# Patient Record
Sex: FEMALE | URBAN - METROPOLITAN AREA
[De-identification: names, ages, dates, MRNs, and addresses within clinical notes are randomized per-mention and may not be internally consistent; named-entity substitution may affect disease eponyms.]

---

## 2017-01-13 ENCOUNTER — APPOINTMENT (RX ONLY)
Dept: URBAN - METROPOLITAN AREA CLINIC 126 | Facility: CLINIC | Age: 42
Setting detail: DERMATOLOGY
End: 2017-01-13

## 2017-01-13 DIAGNOSIS — Z41.9 ENCOUNTER FOR PROCEDURE FOR PURPOSES OTHER THAN REMEDYING HEALTH STATE, UNSPECIFIED: ICD-10-CM

## 2017-01-13 PROCEDURE — ? CHEMICAL PEEL

## 2017-01-13 ASSESSMENT — LOCATION ZONE DERM: LOCATION ZONE: FACE

## 2017-01-13 ASSESSMENT — LOCATION DETAILED DESCRIPTION DERM: LOCATION DETAILED: LEFT INFERIOR CENTRAL MALAR CHEEK

## 2017-01-13 ASSESSMENT — LOCATION SIMPLE DESCRIPTION DERM: LOCATION SIMPLE: LEFT CHEEK

## 2017-01-13 NOTE — PROCEDURE: CHEMICAL PEEL
Treatment Time (Optional): 5 min
Prep: The treated area was degreased with pre-peel cleanser, and vaseline was applied for protection of mucous membranes.
Chemical Peel: Micropeel 20 Solution
Post-Care Instructions: I reviewed with the patient in detail post-care instructions. Patient should avoid sun exposure and wear sun protection.
Consent: Prior to the procedure, written consent was obtained and risks were reviewed, including but not limited to: redness, peeling, blistering, pigmentary change, scarring, infection, and pain.
Detail Level: Zone
Post Peel Care: After the procedure, phyto correct was applied to the treated areas. Sun protection and post-care instructions were reviewed with the patient.
Treatment Number: 0

## 2017-02-14 ENCOUNTER — APPOINTMENT (RX ONLY)
Dept: URBAN - METROPOLITAN AREA CLINIC 126 | Facility: CLINIC | Age: 42
Setting detail: DERMATOLOGY
End: 2017-02-14

## 2017-02-14 DIAGNOSIS — Z41.9 ENCOUNTER FOR PROCEDURE FOR PURPOSES OTHER THAN REMEDYING HEALTH STATE, UNSPECIFIED: ICD-10-CM

## 2017-02-14 PROCEDURE — ? FACIAL

## 2017-02-14 ASSESSMENT — LOCATION SIMPLE DESCRIPTION DERM: LOCATION SIMPLE: LEFT CHEEK

## 2017-02-14 ASSESSMENT — LOCATION ZONE DERM: LOCATION ZONE: FACE

## 2017-02-14 ASSESSMENT — LOCATION DETAILED DESCRIPTION DERM: LOCATION DETAILED: LEFT INFERIOR CENTRAL MALAR CHEEK

## 2017-02-14 NOTE — PROCEDURE: FACIAL
Treatment Type (Optional): Spa Facial
Facial Steaming: steamed
Exfoliation Type: enzyme
Detail Level: Zone
Extraction Method: extractor
Mask Type (Optional): hydrating B5

## 2017-03-15 ENCOUNTER — APPOINTMENT (RX ONLY)
Dept: URBAN - METROPOLITAN AREA CLINIC 126 | Facility: CLINIC | Age: 42
Setting detail: DERMATOLOGY
End: 2017-03-15

## 2017-03-15 DIAGNOSIS — Z41.9 ENCOUNTER FOR PROCEDURE FOR PURPOSES OTHER THAN REMEDYING HEALTH STATE, UNSPECIFIED: ICD-10-CM

## 2017-03-15 PROCEDURE — ? FACIAL

## 2017-03-15 ASSESSMENT — LOCATION SIMPLE DESCRIPTION DERM: LOCATION SIMPLE: LEFT CHEEK

## 2017-03-15 ASSESSMENT — LOCATION ZONE DERM: LOCATION ZONE: FACE

## 2017-03-15 ASSESSMENT — LOCATION DETAILED DESCRIPTION DERM: LOCATION DETAILED: LEFT CENTRAL MALAR CHEEK

## 2017-03-15 NOTE — PROCEDURE: FACIAL
Detail Level: Zone
Treatment Type (Optional): Spa Facial
Exfoliation Type: enzyme
Extraction Method: extractor
Facial Steaming: steamed

## 2017-04-25 ENCOUNTER — APPOINTMENT (RX ONLY)
Dept: URBAN - METROPOLITAN AREA CLINIC 126 | Facility: CLINIC | Age: 42
Setting detail: DERMATOLOGY
End: 2017-04-25

## 2017-04-25 DIAGNOSIS — Z41.9 ENCOUNTER FOR PROCEDURE FOR PURPOSES OTHER THAN REMEDYING HEALTH STATE, UNSPECIFIED: ICD-10-CM

## 2017-04-25 PROCEDURE — ? FACIAL

## 2017-04-25 ASSESSMENT — LOCATION SIMPLE DESCRIPTION DERM: LOCATION SIMPLE: LEFT CHEEK

## 2017-04-25 ASSESSMENT — LOCATION ZONE DERM: LOCATION ZONE: FACE

## 2017-04-25 ASSESSMENT — LOCATION DETAILED DESCRIPTION DERM: LOCATION DETAILED: LEFT INFERIOR MEDIAL MALAR CHEEK

## 2017-04-25 NOTE — PROCEDURE: FACIAL
Extraction Method: extractor
Exfoliation Type: enzyme
Detail Level: Zone
Treatment Type (Optional): Spa Facial
Facial Steaming: steamed

## 2017-05-23 ENCOUNTER — APPOINTMENT (RX ONLY)
Dept: URBAN - METROPOLITAN AREA CLINIC 126 | Facility: CLINIC | Age: 42
Setting detail: DERMATOLOGY
End: 2017-05-23

## 2017-05-23 DIAGNOSIS — Z41.9 ENCOUNTER FOR PROCEDURE FOR PURPOSES OTHER THAN REMEDYING HEALTH STATE, UNSPECIFIED: ICD-10-CM

## 2017-05-23 PROCEDURE — ? FACIAL

## 2017-05-23 ASSESSMENT — LOCATION ZONE DERM: LOCATION ZONE: FACE

## 2017-05-23 ASSESSMENT — LOCATION SIMPLE DESCRIPTION DERM: LOCATION SIMPLE: LEFT CHEEK

## 2017-05-23 ASSESSMENT — LOCATION DETAILED DESCRIPTION DERM: LOCATION DETAILED: LEFT INFERIOR CENTRAL MALAR CHEEK

## 2017-05-23 NOTE — PROCEDURE: FACIAL
Exfoliation Type: enzyme
Detail Level: Zone
Facial Steaming: steamed
Treatment Type (Optional): Spa Facial
Extraction Method: extractor

## 2017-08-17 ENCOUNTER — APPOINTMENT (RX ONLY)
Dept: URBAN - METROPOLITAN AREA CLINIC 126 | Facility: CLINIC | Age: 42
Setting detail: DERMATOLOGY
End: 2017-08-17

## 2017-08-17 DIAGNOSIS — Z41.9 ENCOUNTER FOR PROCEDURE FOR PURPOSES OTHER THAN REMEDYING HEALTH STATE, UNSPECIFIED: ICD-10-CM

## 2017-08-17 PROCEDURE — ? FACIAL

## 2017-08-17 ASSESSMENT — LOCATION SIMPLE DESCRIPTION DERM: LOCATION SIMPLE: LEFT CHEEK

## 2017-08-17 ASSESSMENT — LOCATION ZONE DERM: LOCATION ZONE: FACE

## 2017-08-17 ASSESSMENT — LOCATION DETAILED DESCRIPTION DERM: LOCATION DETAILED: LEFT INFERIOR CENTRAL MALAR CHEEK

## 2017-08-17 NOTE — PROCEDURE: FACIAL
Treatment Type (Optional): Spa Facial
Facial Steaming: steamed
Exfoliation Type: enzyme
Extraction Method: extractor
Detail Level: Zone

## 2017-09-26 ENCOUNTER — APPOINTMENT (RX ONLY)
Dept: URBAN - METROPOLITAN AREA CLINIC 126 | Facility: CLINIC | Age: 42
Setting detail: DERMATOLOGY
End: 2017-09-26

## 2017-09-26 DIAGNOSIS — Z41.9 ENCOUNTER FOR PROCEDURE FOR PURPOSES OTHER THAN REMEDYING HEALTH STATE, UNSPECIFIED: ICD-10-CM

## 2017-09-26 PROCEDURE — ? FACIAL

## 2017-09-26 ASSESSMENT — LOCATION SIMPLE DESCRIPTION DERM: LOCATION SIMPLE: LEFT CHEEK

## 2017-09-26 ASSESSMENT — LOCATION ZONE DERM: LOCATION ZONE: FACE

## 2017-09-26 ASSESSMENT — LOCATION DETAILED DESCRIPTION DERM: LOCATION DETAILED: LEFT INFERIOR CENTRAL MALAR CHEEK

## 2017-09-26 NOTE — PROCEDURE: FACIAL
Detail Level: Zone
Facial Steaming: steamed
Extraction Method: extractor
Treatment Type (Optional): Spa Facial
Exfoliation Type: enzyme

## 2017-10-16 ENCOUNTER — APPOINTMENT (RX ONLY)
Dept: URBAN - METROPOLITAN AREA CLINIC 126 | Facility: CLINIC | Age: 42
Setting detail: DERMATOLOGY
End: 2017-10-16

## 2017-10-16 DIAGNOSIS — Z41.9 ENCOUNTER FOR PROCEDURE FOR PURPOSES OTHER THAN REMEDYING HEALTH STATE, UNSPECIFIED: ICD-10-CM

## 2017-10-16 PROCEDURE — ? FACIAL

## 2017-10-16 ASSESSMENT — LOCATION SIMPLE DESCRIPTION DERM: LOCATION SIMPLE: LEFT CHEEK

## 2017-10-16 ASSESSMENT — LOCATION DETAILED DESCRIPTION DERM: LOCATION DETAILED: LEFT INFERIOR CENTRAL MALAR CHEEK

## 2017-10-16 ASSESSMENT — LOCATION ZONE DERM: LOCATION ZONE: FACE

## 2017-10-16 NOTE — PROCEDURE: FACIAL
Treatment Type (Optional): Spa Facial
Exfoliation Type: enzyme
Detail Level: Zone
Extraction Method: extractor
Facial Steaming: steamed

## 2017-11-09 ENCOUNTER — APPOINTMENT (RX ONLY)
Dept: URBAN - METROPOLITAN AREA CLINIC 126 | Facility: CLINIC | Age: 42
Setting detail: DERMATOLOGY
End: 2017-11-09

## 2017-11-09 DIAGNOSIS — Z41.9 ENCOUNTER FOR PROCEDURE FOR PURPOSES OTHER THAN REMEDYING HEALTH STATE, UNSPECIFIED: ICD-10-CM

## 2017-11-09 PROCEDURE — ? FACIAL

## 2017-11-09 ASSESSMENT — LOCATION ZONE DERM: LOCATION ZONE: FACE

## 2017-11-09 ASSESSMENT — LOCATION SIMPLE DESCRIPTION DERM: LOCATION SIMPLE: LEFT CHEEK

## 2017-11-09 ASSESSMENT — LOCATION DETAILED DESCRIPTION DERM: LOCATION DETAILED: LEFT SUPERIOR CENTRAL BUCCAL CHEEK

## 2017-11-09 NOTE — PROCEDURE: FACIAL
Facial Steaming: steamed
Exfoliation Type: enzyme
Extraction Method: extractor
Detail Level: Zone
Treatment Type (Optional): Spa Facial

## 2017-12-07 ENCOUNTER — APPOINTMENT (RX ONLY)
Dept: URBAN - METROPOLITAN AREA CLINIC 126 | Facility: CLINIC | Age: 42
Setting detail: DERMATOLOGY
End: 2017-12-07

## 2017-12-07 DIAGNOSIS — Z41.9 ENCOUNTER FOR PROCEDURE FOR PURPOSES OTHER THAN REMEDYING HEALTH STATE, UNSPECIFIED: ICD-10-CM

## 2017-12-07 PROCEDURE — ? FACIAL

## 2017-12-07 ASSESSMENT — LOCATION SIMPLE DESCRIPTION DERM: LOCATION SIMPLE: LEFT CHEEK

## 2017-12-07 ASSESSMENT — LOCATION ZONE DERM: LOCATION ZONE: FACE

## 2017-12-07 ASSESSMENT — LOCATION DETAILED DESCRIPTION DERM: LOCATION DETAILED: LEFT INFERIOR CENTRAL MALAR CHEEK

## 2017-12-20 ENCOUNTER — APPOINTMENT (RX ONLY)
Dept: URBAN - METROPOLITAN AREA CLINIC 126 | Facility: CLINIC | Age: 42
Setting detail: DERMATOLOGY
End: 2017-12-20

## 2017-12-20 DIAGNOSIS — L82.0 INFLAMED SEBORRHEIC KERATOSIS: ICD-10-CM

## 2017-12-20 PROCEDURE — ? LIQUID NITROGEN

## 2017-12-20 PROCEDURE — 17110 DESTRUCTION B9 LES UP TO 14: CPT

## 2017-12-20 ASSESSMENT — LOCATION ZONE DERM: LOCATION ZONE: TRUNK

## 2017-12-20 ASSESSMENT — LOCATION SIMPLE DESCRIPTION DERM: LOCATION SIMPLE: LEFT BUTTOCK

## 2017-12-20 ASSESSMENT — LOCATION DETAILED DESCRIPTION DERM: LOCATION DETAILED: LEFT BUTTOCK

## 2017-12-20 NOTE — PROCEDURE: LIQUID NITROGEN
Medical Necessity Clause: This procedure was medically necessary because the lesions that were treated were tender.
Consent: The patient's consent was obtained including but not limited to risks of crusting, scabbing, blistering, scarring, darker or lighter pigmentary change, recurrence, incomplete removal and infection.
Detail Level: Simple
Render Post-Care Instructions In Note?: yes
Include Z78.9 (Other Specified Conditions Influencing Health Status) As An Associated Diagnosis?: No
Post-Care Instructions: I reviewed with the patient in detail post-care instructions. Patient is to wear sunprotection, and avoid picking at any of the treated lesions. Pt may apply Vaseline to crusted or scabbing areas.
Medical Necessity Information: It is in your best interest to select a reason for this procedure from the list below. All of these items fulfill various CMS LCD requirements except the new and changing color options.

## 2018-01-04 ENCOUNTER — APPOINTMENT (RX ONLY)
Dept: URBAN - METROPOLITAN AREA CLINIC 126 | Facility: CLINIC | Age: 43
Setting detail: DERMATOLOGY
End: 2018-01-04

## 2018-01-04 DIAGNOSIS — Z41.9 ENCOUNTER FOR PROCEDURE FOR PURPOSES OTHER THAN REMEDYING HEALTH STATE, UNSPECIFIED: ICD-10-CM

## 2018-01-04 PROCEDURE — ? FACIAL

## 2018-01-04 ASSESSMENT — LOCATION ZONE DERM: LOCATION ZONE: FACE

## 2018-01-04 ASSESSMENT — LOCATION SIMPLE DESCRIPTION DERM: LOCATION SIMPLE: LEFT CHEEK

## 2018-01-04 ASSESSMENT — LOCATION DETAILED DESCRIPTION DERM: LOCATION DETAILED: LEFT INFERIOR CENTRAL MALAR CHEEK

## 2018-01-04 NOTE — PROCEDURE: FACIAL
Extraction Method: extractor
Exfoliation Type: enzyme
Facial Steaming: steamed
Detail Level: Zone
Treatment Type (Optional): Spa Facial

## 2018-02-01 ENCOUNTER — APPOINTMENT (RX ONLY)
Dept: URBAN - METROPOLITAN AREA CLINIC 126 | Facility: CLINIC | Age: 43
Setting detail: DERMATOLOGY
End: 2018-02-01

## 2018-02-01 DIAGNOSIS — Z41.9 ENCOUNTER FOR PROCEDURE FOR PURPOSES OTHER THAN REMEDYING HEALTH STATE, UNSPECIFIED: ICD-10-CM

## 2018-02-01 PROCEDURE — ? FACIAL

## 2018-02-01 ASSESSMENT — LOCATION SIMPLE DESCRIPTION DERM: LOCATION SIMPLE: LEFT CHEEK

## 2018-02-01 ASSESSMENT — LOCATION DETAILED DESCRIPTION DERM: LOCATION DETAILED: LEFT INFERIOR MEDIAL MALAR CHEEK

## 2018-02-01 ASSESSMENT — LOCATION ZONE DERM: LOCATION ZONE: FACE

## 2018-02-01 NOTE — PROCEDURE: FACIAL
Treatment Type (Optional): Spa Facial
Facial Steaming: steamed
Detail Level: Zone
Exfoliation Type: enzyme
Extraction Method: extractor

## 2018-03-01 ENCOUNTER — APPOINTMENT (RX ONLY)
Dept: URBAN - METROPOLITAN AREA CLINIC 126 | Facility: CLINIC | Age: 43
Setting detail: DERMATOLOGY
End: 2018-03-01

## 2018-03-01 DIAGNOSIS — Z41.9 ENCOUNTER FOR PROCEDURE FOR PURPOSES OTHER THAN REMEDYING HEALTH STATE, UNSPECIFIED: ICD-10-CM

## 2018-03-01 PROCEDURE — ? FACIAL

## 2018-03-01 ASSESSMENT — LOCATION DETAILED DESCRIPTION DERM: LOCATION DETAILED: LEFT INFERIOR CENTRAL MALAR CHEEK

## 2018-03-01 ASSESSMENT — LOCATION SIMPLE DESCRIPTION DERM: LOCATION SIMPLE: LEFT CHEEK

## 2018-03-01 ASSESSMENT — LOCATION ZONE DERM: LOCATION ZONE: FACE

## 2018-03-08 ENCOUNTER — APPOINTMENT (RX ONLY)
Dept: URBAN - METROPOLITAN AREA CLINIC 126 | Facility: CLINIC | Age: 43
Setting detail: DERMATOLOGY
End: 2018-03-08

## 2018-03-08 DIAGNOSIS — Z41.9 ENCOUNTER FOR PROCEDURE FOR PURPOSES OTHER THAN REMEDYING HEALTH STATE, UNSPECIFIED: ICD-10-CM

## 2018-03-08 PROCEDURE — ? ULTHERAPY

## 2018-03-08 NOTE — PROCEDURE: ULTHERAPY
Detail Level: Zone
Total Lines (Use Numbers Only, No Special Characters Or $): 24 [address] [place]
Pre-Procedures Photographs: Yes
Post-Procedures Photographs: No
Treatment Number (Optional): 1
Patient Reported Discomfort: 0
Anesthesia Volume In Cc: 6
Post-Care Instructions: I reviewed with the patient in detail post-care instructions. Patient should stay away from the sun and wear sun protection until treated areas are fully healed.
Consent: Written consent obtained, risks reviewed including but not limited to crusting, scabbing, blistering, scarring, darker or lighter pigmentary change, and/or incomplete improvement.
Anesthesia Type: 1% lidocaine with epinephrine

## 2018-03-09 ENCOUNTER — APPOINTMENT (RX ONLY)
Dept: URBAN - METROPOLITAN AREA CLINIC 126 | Facility: CLINIC | Age: 43
Setting detail: DERMATOLOGY
End: 2018-03-09

## 2018-03-09 DIAGNOSIS — Z41.9 ENCOUNTER FOR PROCEDURE FOR PURPOSES OTHER THAN REMEDYING HEALTH STATE, UNSPECIFIED: ICD-10-CM

## 2018-03-09 PROCEDURE — ? ULTHERAPY

## 2018-03-09 NOTE — PROCEDURE: ULTHERAPY
Consent: Written consent obtained, risks reviewed including but not limited to crusting, scabbing, blistering, scarring, darker or lighter pigmentary change, and/or incomplete improvement.
Anesthesia Volume In Cc: 6
Treatment Number (Optional): 1
Patient Reported Discomfort: 0
Detail Level: Zone
Post-Procedures Photographs: No
Anesthesia Type: 1% lidocaine with epinephrine
Post-Care Instructions: I reviewed with the patient in detail post-care instructions. Patient should stay away from the sun and wear sun protection until treated areas are fully healed.
Pre-Procedures Photographs: Yes

## 2018-03-29 ENCOUNTER — APPOINTMENT (RX ONLY)
Dept: URBAN - METROPOLITAN AREA CLINIC 126 | Facility: CLINIC | Age: 43
Setting detail: DERMATOLOGY
End: 2018-03-29

## 2018-03-29 DIAGNOSIS — Z41.9 ENCOUNTER FOR PROCEDURE FOR PURPOSES OTHER THAN REMEDYING HEALTH STATE, UNSPECIFIED: ICD-10-CM

## 2018-03-29 PROCEDURE — ? FACIAL

## 2018-03-29 ASSESSMENT — LOCATION ZONE DERM: LOCATION ZONE: FACE

## 2018-03-29 ASSESSMENT — LOCATION SIMPLE DESCRIPTION DERM: LOCATION SIMPLE: LEFT CHEEK

## 2018-03-29 ASSESSMENT — LOCATION DETAILED DESCRIPTION DERM: LOCATION DETAILED: LEFT INFERIOR CENTRAL MALAR CHEEK

## 2018-03-29 NOTE — PROCEDURE: FACIAL
Extraction Method: extractor
Detail Level: Zone
Facial Steaming: steamed
Exfoliation Type: enzyme
Treatment Type (Optional): Spa Facial

## 2018-04-26 ENCOUNTER — APPOINTMENT (RX ONLY)
Dept: URBAN - METROPOLITAN AREA CLINIC 126 | Facility: CLINIC | Age: 43
Setting detail: DERMATOLOGY
End: 2018-04-26

## 2018-04-26 DIAGNOSIS — Z41.9 ENCOUNTER FOR PROCEDURE FOR PURPOSES OTHER THAN REMEDYING HEALTH STATE, UNSPECIFIED: ICD-10-CM

## 2018-04-26 PROCEDURE — ? FACIAL

## 2018-04-26 ASSESSMENT — LOCATION ZONE DERM: LOCATION ZONE: FACE

## 2018-04-26 ASSESSMENT — LOCATION DETAILED DESCRIPTION DERM: LOCATION DETAILED: LEFT INFERIOR MEDIAL MALAR CHEEK

## 2018-04-26 ASSESSMENT — LOCATION SIMPLE DESCRIPTION DERM: LOCATION SIMPLE: LEFT CHEEK

## 2018-04-26 NOTE — PROCEDURE: FACIAL
Extraction Method: extractor
Facial Steaming: steamed
Treatment Type (Optional): Spa Facial
Exfoliation Type: enzyme
Detail Level: Zone

## 2018-05-23 ENCOUNTER — APPOINTMENT (RX ONLY)
Dept: URBAN - METROPOLITAN AREA CLINIC 126 | Facility: CLINIC | Age: 43
Setting detail: DERMATOLOGY
End: 2018-05-23

## 2018-05-23 DIAGNOSIS — Z41.9 ENCOUNTER FOR PROCEDURE FOR PURPOSES OTHER THAN REMEDYING HEALTH STATE, UNSPECIFIED: ICD-10-CM

## 2018-05-23 PROCEDURE — ? FACIAL

## 2018-05-23 ASSESSMENT — LOCATION ZONE DERM: LOCATION ZONE: FACE

## 2018-05-23 ASSESSMENT — LOCATION DETAILED DESCRIPTION DERM: LOCATION DETAILED: LEFT INFERIOR CENTRAL MALAR CHEEK

## 2018-05-23 ASSESSMENT — LOCATION SIMPLE DESCRIPTION DERM: LOCATION SIMPLE: LEFT CHEEK

## 2018-05-23 NOTE — PROCEDURE: FACIAL
Treatment Type (Optional): Spa Facial
Detail Level: Zone
Facial Steaming: steamed
Exfoliation Type: enzyme
Extraction Method: extractor

## 2018-06-19 ENCOUNTER — APPOINTMENT (RX ONLY)
Dept: URBAN - METROPOLITAN AREA CLINIC 126 | Facility: CLINIC | Age: 43
Setting detail: DERMATOLOGY
End: 2018-06-19

## 2018-06-19 DIAGNOSIS — L70.0 ACNE VULGARIS: ICD-10-CM

## 2018-06-19 DIAGNOSIS — Z41.9 ENCOUNTER FOR PROCEDURE FOR PURPOSES OTHER THAN REMEDYING HEALTH STATE, UNSPECIFIED: ICD-10-CM

## 2018-06-19 PROCEDURE — ? FACIAL

## 2018-06-19 ASSESSMENT — LOCATION ZONE DERM: LOCATION ZONE: FACE

## 2018-06-19 ASSESSMENT — LOCATION DETAILED DESCRIPTION DERM: LOCATION DETAILED: LEFT INFERIOR CENTRAL MALAR CHEEK

## 2018-06-19 ASSESSMENT — LOCATION SIMPLE DESCRIPTION DERM: LOCATION SIMPLE: LEFT CHEEK

## 2018-06-19 NOTE — PROCEDURE: FACIAL
Detail Level: Zone
Exfoliation Type: enzyme
Facial Steaming: steamed
Extraction Method: extractor
Treatment Type (Optional): Spa Facial

## 2018-06-21 ENCOUNTER — APPOINTMENT (RX ONLY)
Dept: URBAN - METROPOLITAN AREA CLINIC 126 | Facility: CLINIC | Age: 43
Setting detail: DERMATOLOGY
End: 2018-06-21

## 2018-06-21 DIAGNOSIS — Z41.9 ENCOUNTER FOR PROCEDURE FOR PURPOSES OTHER THAN REMEDYING HEALTH STATE, UNSPECIFIED: ICD-10-CM

## 2018-06-21 PROCEDURE — ? BENIGN DESTRUCTION COSMETIC

## 2018-06-21 ASSESSMENT — LOCATION DETAILED DESCRIPTION DERM
LOCATION DETAILED: RIGHT MEDIAL MANDIBULAR CHEEK
LOCATION DETAILED: LEFT INFERIOR LATERAL BUCCAL CHEEK
LOCATION DETAILED: RIGHT MEDIAL MALAR CHEEK

## 2018-06-21 ASSESSMENT — LOCATION SIMPLE DESCRIPTION DERM
LOCATION SIMPLE: LEFT CHEEK
LOCATION SIMPLE: RIGHT CHEEK

## 2018-06-21 ASSESSMENT — LOCATION ZONE DERM: LOCATION ZONE: FACE

## 2018-06-21 NOTE — PROCEDURE: BENIGN DESTRUCTION COSMETIC
Post-Care Instructions: I reviewed with the patient in detail post-care instructions. Patient is to wear sunprotection, and avoid picking at any of the treated lesions. Pt may apply Vaseline to crusted or scabbing areas.
Price (Use Numbers Only, No Special Characters Or $): 100
Detail Level: Detailed
Anesthesia Volume In Cc: 0
Consent: The patient's consent was obtained including but not limited to risks of crusting, scabbing, blistering, scarring, darker or lighter pigmentary change, recurrence, incomplete removal and infection.

## 2018-07-31 ENCOUNTER — APPOINTMENT (RX ONLY)
Dept: URBAN - METROPOLITAN AREA CLINIC 126 | Facility: CLINIC | Age: 43
Setting detail: DERMATOLOGY
End: 2018-07-31

## 2018-07-31 DIAGNOSIS — Z41.9 ENCOUNTER FOR PROCEDURE FOR PURPOSES OTHER THAN REMEDYING HEALTH STATE, UNSPECIFIED: ICD-10-CM

## 2018-07-31 PROCEDURE — ? FACIAL

## 2018-07-31 ASSESSMENT — LOCATION DETAILED DESCRIPTION DERM: LOCATION DETAILED: LEFT INFERIOR CENTRAL MALAR CHEEK

## 2018-07-31 ASSESSMENT — LOCATION SIMPLE DESCRIPTION DERM: LOCATION SIMPLE: LEFT CHEEK

## 2018-07-31 ASSESSMENT — LOCATION ZONE DERM: LOCATION ZONE: FACE

## 2018-07-31 NOTE — PROCEDURE: FACIAL
Facial Steaming: steamed
Extraction Method: extractor
Detail Level: Zone
Exfoliation Type: enzyme
Treatment Type (Optional): Spa Facial

## 2018-08-01 ENCOUNTER — APPOINTMENT (RX ONLY)
Dept: URBAN - METROPOLITAN AREA CLINIC 126 | Facility: CLINIC | Age: 43
Setting detail: DERMATOLOGY
End: 2018-08-01

## 2018-08-01 DIAGNOSIS — Z41.9 ENCOUNTER FOR PROCEDURE FOR PURPOSES OTHER THAN REMEDYING HEALTH STATE, UNSPECIFIED: ICD-10-CM

## 2018-08-01 PROCEDURE — ? COOLSCULPTING

## 2018-08-01 NOTE — PROCEDURE: COOLSCULPTING
Location 1: upper abdomen (left)
Time (Minutes - Will Only Render If Nonzero): [address] [facility]
Applicator Size: CoolFit applicator
Device: Coolsculpting
Time (Minutes - Will Only Render If Nonzero): 0
Suction Settings: The suction settings were per protocol.
Suction Settings: 70-80-70
Time (Minutes - Will Only Render If Nonzero): 60
Detail Level: Zone
Consent: Written consent obtained, risks reviewed including, but not limited to, blistering from suction, darker or lighter pigmentary change, bruising, and/or need for multiple treatments.
Treatment Administered By (Optional): Erleen Olszewski
Location 2: upper abdomen (right)
Post Treatment: After treatment, the suction was turned off, and the applicator was removed from the skin.
Intro: Prior to treatment, the area was cleaned with alcohol and marked out with a marking pen. The gel sheet was then applied uniformly. The applicator was applied to the skin with good contact and suction.

## 2018-08-27 ENCOUNTER — APPOINTMENT (RX ONLY)
Dept: URBAN - METROPOLITAN AREA CLINIC 126 | Facility: CLINIC | Age: 43
Setting detail: DERMATOLOGY
End: 2018-08-27

## 2018-08-27 DIAGNOSIS — Z41.9 ENCOUNTER FOR PROCEDURE FOR PURPOSES OTHER THAN REMEDYING HEALTH STATE, UNSPECIFIED: ICD-10-CM

## 2018-08-27 PROCEDURE — ? FACIAL

## 2018-08-27 ASSESSMENT — LOCATION DETAILED DESCRIPTION DERM: LOCATION DETAILED: LEFT INFERIOR CENTRAL MALAR CHEEK

## 2018-08-27 ASSESSMENT — LOCATION SIMPLE DESCRIPTION DERM: LOCATION SIMPLE: LEFT CHEEK

## 2018-08-27 ASSESSMENT — LOCATION ZONE DERM: LOCATION ZONE: FACE

## 2018-08-27 NOTE — PROCEDURE: FACIAL
Facial Steaming: steamed
Detail Level: Zone
Exfoliation Type: enzyme
Treatment Type (Optional): Spa Facial
Extraction Method: extractor

## 2018-09-12 ENCOUNTER — APPOINTMENT (RX ONLY)
Dept: URBAN - METROPOLITAN AREA CLINIC 126 | Facility: CLINIC | Age: 43
Setting detail: DERMATOLOGY
End: 2018-09-12

## 2018-09-12 DIAGNOSIS — Z41.9 ENCOUNTER FOR PROCEDURE FOR PURPOSES OTHER THAN REMEDYING HEALTH STATE, UNSPECIFIED: ICD-10-CM

## 2018-09-12 PROCEDURE — ? COOLSCULPTING

## 2018-09-12 NOTE — PROCEDURE: COOLSCULPTING
Time (Minutes - Will Only Render If Nonzero): [address] [facility]
Suction Settings: The suction settings were per protocol.
Treatment Administered By (Optional): Leonard Dill
Applicator Size: [facility] applicator
Time (Minutes - Will Only Render If Nonzero): 0
Suction Settings: 70-80-70
Time (Minutes - Will Only Render If Nonzero): 60
Applicator Size: CoolFit applicator
Post Treatment: After treatment, the suction was turned off, and the applicator was removed from the skin.
Location 2: lower abdomen (left)
Device: Coolsculpting
Detail Level: Zone
Intro: Prior to treatment, the area was cleaned with alcohol and marked out with a marking pen. The gel sheet was then applied uniformly. The applicator was applied to the skin with good contact and suction.
Consent: Written consent obtained, risks reviewed including, but not limited to, blistering from suction, darker or lighter pigmentary change, bruising, and/or need for multiple treatments.
Applicator Size: CoolCurve Plus applicator

## 2018-09-24 ENCOUNTER — APPOINTMENT (RX ONLY)
Dept: URBAN - METROPOLITAN AREA CLINIC 126 | Facility: CLINIC | Age: 43
Setting detail: DERMATOLOGY
End: 2018-09-24

## 2018-09-24 DIAGNOSIS — Z41.9 ENCOUNTER FOR PROCEDURE FOR PURPOSES OTHER THAN REMEDYING HEALTH STATE, UNSPECIFIED: ICD-10-CM

## 2018-09-24 PROCEDURE — ? HYDRAFACIAL

## 2018-09-24 ASSESSMENT — LOCATION DETAILED DESCRIPTION DERM: LOCATION DETAILED: LEFT INFERIOR CENTRAL MALAR CHEEK

## 2018-09-24 ASSESSMENT — LOCATION ZONE DERM: LOCATION ZONE: FACE

## 2018-09-24 ASSESSMENT — LOCATION SIMPLE DESCRIPTION DERM: LOCATION SIMPLE: LEFT CHEEK

## 2018-09-24 NOTE — PROCEDURE: HYDRAFACIAL
Additional Vacuum Pressure (Won't Render If 0): 0
Detail Level: Zone
Post-Care Instructions: I reviewed with the patient in detail post-care instructions. Patient should stay away from the sun and wear sun protection until treated areas are fully healed.
Treatment Number: 1
Indication: skin texture
Vacuum Pressure: 10
Consent: Written consent obtained, risks reviewed including but not limited to crusting, scabbing, blistering, scarring, darker or lighter pigmentary change, bruising, and/or incomplete response.

## 2018-10-22 ENCOUNTER — APPOINTMENT (RX ONLY)
Dept: URBAN - METROPOLITAN AREA CLINIC 126 | Facility: CLINIC | Age: 43
Setting detail: DERMATOLOGY
End: 2018-10-22

## 2018-11-01 ENCOUNTER — APPOINTMENT (RX ONLY)
Dept: URBAN - METROPOLITAN AREA CLINIC 126 | Facility: CLINIC | Age: 43
Setting detail: DERMATOLOGY
End: 2018-11-01

## 2018-11-01 DIAGNOSIS — Z41.9 ENCOUNTER FOR PROCEDURE FOR PURPOSES OTHER THAN REMEDYING HEALTH STATE, UNSPECIFIED: ICD-10-CM

## 2018-11-01 PROCEDURE — ? HYDRAFACIAL

## 2018-11-01 ASSESSMENT — LOCATION SIMPLE DESCRIPTION DERM: LOCATION SIMPLE: LEFT CHEEK

## 2018-11-01 ASSESSMENT — LOCATION ZONE DERM: LOCATION ZONE: FACE

## 2018-11-01 ASSESSMENT — LOCATION DETAILED DESCRIPTION DERM: LOCATION DETAILED: LEFT INFERIOR MEDIAL MALAR CHEEK

## 2018-11-01 NOTE — PROCEDURE: HYDRAFACIAL
Post-Care Instructions: I reviewed with the patient in detail post-care instructions. Patient should stay away from the sun and wear sun protection until treated areas are fully healed.
Vacuum Pressure: 10
Additional Vacuum Pressure (Won't Render If 0): 0
Detail Level: Zone
Treatment Number: 1
Consent: Written consent obtained, risks reviewed including but not limited to crusting, scabbing, blistering, scarring, darker or lighter pigmentary change, bruising, and/or incomplete response.
Indication: skin texture

## 2018-12-03 ENCOUNTER — APPOINTMENT (RX ONLY)
Dept: URBAN - METROPOLITAN AREA CLINIC 126 | Facility: CLINIC | Age: 43
Setting detail: DERMATOLOGY
End: 2018-12-03

## 2018-12-03 DIAGNOSIS — Z41.9 ENCOUNTER FOR PROCEDURE FOR PURPOSES OTHER THAN REMEDYING HEALTH STATE, UNSPECIFIED: ICD-10-CM

## 2018-12-03 PROCEDURE — ? HYDRAFACIAL

## 2018-12-03 ASSESSMENT — LOCATION SIMPLE DESCRIPTION DERM: LOCATION SIMPLE: LEFT CHEEK

## 2018-12-03 ASSESSMENT — LOCATION DETAILED DESCRIPTION DERM: LOCATION DETAILED: LEFT INFERIOR CENTRAL MALAR CHEEK

## 2018-12-03 ASSESSMENT — LOCATION ZONE DERM: LOCATION ZONE: FACE

## 2018-12-03 NOTE — PROCEDURE: HYDRAFACIAL
Number Of Passes: 0
Indication: skin texture
Detail Level: Zone
Consent: Written consent obtained, risks reviewed including but not limited to crusting, scabbing, blistering, scarring, darker or lighter pigmentary change, bruising, and/or incomplete response.
Treatment Number: 1
Post-Care Instructions: I reviewed with the patient in detail post-care instructions. Patient should stay away from the sun and wear sun protection until treated areas are fully healed.
Vacuum Pressure: 10

## 2019-01-02 ENCOUNTER — APPOINTMENT (RX ONLY)
Dept: URBAN - METROPOLITAN AREA CLINIC 126 | Facility: CLINIC | Age: 44
Setting detail: DERMATOLOGY
End: 2019-01-02

## 2019-01-02 DIAGNOSIS — Z41.9 ENCOUNTER FOR PROCEDURE FOR PURPOSES OTHER THAN REMEDYING HEALTH STATE, UNSPECIFIED: ICD-10-CM

## 2019-01-02 PROCEDURE — ? COSMETIC FOLLOW-UP

## 2019-01-02 NOTE — PROCEDURE: COSMETIC FOLLOW-UP
Comments (Free Text): Photos taken. Not a lot of visible improvement, pt states that she has gained weight with recent travels and holidays therefore effecting her results. Discussed in great detail the need for proper diet and exercise for optimal result s and to maintain results. Discussed being able to treat areas again. We can reassess when pt is ready to move forward.  Pt verbalizes understanding
Treatment (Optional): CoolSculpting
Side Effects Or Complications: None
Detail Level: Zone
Global Improvement: Good
Patient Satisfaction: Pleased

## 2019-01-08 ENCOUNTER — APPOINTMENT (RX ONLY)
Dept: URBAN - METROPOLITAN AREA CLINIC 126 | Facility: CLINIC | Age: 44
Setting detail: DERMATOLOGY
End: 2019-01-08

## 2019-01-08 DIAGNOSIS — Z41.9 ENCOUNTER FOR PROCEDURE FOR PURPOSES OTHER THAN REMEDYING HEALTH STATE, UNSPECIFIED: ICD-10-CM

## 2019-01-08 PROCEDURE — ? HYDRAFACIAL

## 2019-01-08 ASSESSMENT — LOCATION DETAILED DESCRIPTION DERM: LOCATION DETAILED: LEFT MEDIAL MALAR CHEEK

## 2019-01-08 ASSESSMENT — LOCATION SIMPLE DESCRIPTION DERM: LOCATION SIMPLE: LEFT CHEEK

## 2019-01-08 ASSESSMENT — LOCATION ZONE DERM: LOCATION ZONE: FACE

## 2019-01-08 NOTE — PROCEDURE: HYDRAFACIAL
Additional Vacuum Pressure (Won't Render If 0): 0
Treatment Number: 1
Vacuum Pressure: 10
Detail Level: Zone
Post-Care Instructions: I reviewed with the patient in detail post-care instructions. Patient should stay away from the sun and wear sun protection until treated areas are fully healed.
Indication: skin texture
Consent: Written consent obtained, risks reviewed including but not limited to crusting, scabbing, blistering, scarring, darker or lighter pigmentary change, bruising, and/or incomplete response.

## 2019-03-05 ENCOUNTER — APPOINTMENT (RX ONLY)
Dept: URBAN - METROPOLITAN AREA CLINIC 126 | Facility: CLINIC | Age: 44
Setting detail: DERMATOLOGY
End: 2019-03-05

## 2019-03-05 DIAGNOSIS — Z41.9 ENCOUNTER FOR PROCEDURE FOR PURPOSES OTHER THAN REMEDYING HEALTH STATE, UNSPECIFIED: ICD-10-CM

## 2019-03-05 PROCEDURE — ? HYDRAFACIAL

## 2019-03-05 ASSESSMENT — LOCATION DETAILED DESCRIPTION DERM: LOCATION DETAILED: LEFT INFERIOR CENTRAL MALAR CHEEK

## 2019-03-05 ASSESSMENT — LOCATION SIMPLE DESCRIPTION DERM: LOCATION SIMPLE: LEFT CHEEK

## 2019-03-05 ASSESSMENT — LOCATION ZONE DERM: LOCATION ZONE: FACE

## 2019-03-05 NOTE — PROCEDURE: HYDRAFACIAL
Vacuum Pressure: 10
Additional Vacuum Pressure (Won't Render If 0): 0
Glycolic Acid %: 7.5%
Detail Level: Zone
Treatment Number: 1
Consent: Written consent obtained, risks reviewed including but not limited to crusting, scabbing, blistering, scarring, darker or lighter pigmentary change, bruising, and/or incomplete response.
Indication: skin texture
Post-Care Instructions: I reviewed with the patient in detail post-care instructions. Patient should stay away from the sun and wear sun protection until treated areas are fully healed.

## 2019-03-12 ENCOUNTER — IMPORTED ENCOUNTER (OUTPATIENT)
Dept: URBAN - METROPOLITAN AREA CLINIC 31 | Facility: CLINIC | Age: 44
End: 2019-03-12

## 2019-03-12 PROBLEM — H18.461: Noted: 2019-03-12

## 2019-03-12 PROCEDURE — 92015 DETERMINE REFRACTIVE STATE: CPT

## 2019-03-12 PROCEDURE — 92004 COMPRE OPH EXAM NEW PT 1/>: CPT

## 2019-03-12 NOTE — PATIENT DISCUSSION
1.  [place]--  Wearing progressive --No rx change2. Peripheral degeneration nasally OD--  3.   RTN 1 yr CE

## 2019-04-05 ENCOUNTER — APPOINTMENT (RX ONLY)
Dept: URBAN - METROPOLITAN AREA CLINIC 126 | Facility: CLINIC | Age: 44
Setting detail: DERMATOLOGY
End: 2019-04-05

## 2019-04-05 DIAGNOSIS — Z41.9 ENCOUNTER FOR PROCEDURE FOR PURPOSES OTHER THAN REMEDYING HEALTH STATE, UNSPECIFIED: ICD-10-CM

## 2019-04-05 PROCEDURE — ? HYDRAFACIAL

## 2019-04-05 ASSESSMENT — LOCATION DETAILED DESCRIPTION DERM: LOCATION DETAILED: LEFT INFERIOR CENTRAL MALAR CHEEK

## 2019-04-05 ASSESSMENT — LOCATION ZONE DERM: LOCATION ZONE: FACE

## 2019-04-05 ASSESSMENT — LOCATION SIMPLE DESCRIPTION DERM: LOCATION SIMPLE: LEFT CHEEK

## 2019-04-05 NOTE — PROCEDURE: HYDRAFACIAL
Number Of Passes: 0
Post-Care Instructions: I reviewed with the patient in detail post-care instructions. Patient should stay away from the sun and wear sun protection until treated areas are fully healed.
Treatment Number: 1
Indication: skin texture
Vacuum Pressure: 10
Detail Level: Zone
Consent: Written consent obtained, risks reviewed including but not limited to crusting, scabbing, blistering, scarring, darker or lighter pigmentary change, bruising, and/or incomplete response.

## 2019-04-26 ENCOUNTER — APPOINTMENT (RX ONLY)
Dept: URBAN - METROPOLITAN AREA CLINIC 126 | Facility: CLINIC | Age: 44
Setting detail: DERMATOLOGY
End: 2019-04-26

## 2019-04-26 DIAGNOSIS — Z41.9 ENCOUNTER FOR PROCEDURE FOR PURPOSES OTHER THAN REMEDYING HEALTH STATE, UNSPECIFIED: ICD-10-CM

## 2019-04-26 PROCEDURE — ? HYDRAFACIAL

## 2019-04-26 ASSESSMENT — LOCATION SIMPLE DESCRIPTION DERM: LOCATION SIMPLE: LEFT CHEEK

## 2019-04-26 ASSESSMENT — LOCATION DETAILED DESCRIPTION DERM: LOCATION DETAILED: LEFT INFERIOR CENTRAL MALAR CHEEK

## 2019-04-26 ASSESSMENT — LOCATION ZONE DERM: LOCATION ZONE: FACE

## 2019-04-26 NOTE — PROCEDURE: HYDRAFACIAL
Post-Care Instructions: I reviewed with the patient in detail post-care instructions. Patient should stay away from the sun and wear sun protection until treated areas are fully healed.
Additional Vacuum Pressure (Won't Render If 0): 0
Consent: Written consent obtained, risks reviewed including but not limited to crusting, scabbing, blistering, scarring, darker or lighter pigmentary change, bruising, and/or incomplete response.
Treatment Number: 1
Vacuum Pressure: 10
Indication: skin texture
Detail Level: Zone

## 2019-05-24 ENCOUNTER — APPOINTMENT (RX ONLY)
Dept: URBAN - METROPOLITAN AREA CLINIC 126 | Facility: CLINIC | Age: 44
Setting detail: DERMATOLOGY
End: 2019-05-24

## 2019-05-24 DIAGNOSIS — Z41.9 ENCOUNTER FOR PROCEDURE FOR PURPOSES OTHER THAN REMEDYING HEALTH STATE, UNSPECIFIED: ICD-10-CM

## 2019-05-24 PROCEDURE — ? HYDRAFACIAL

## 2019-05-24 ASSESSMENT — LOCATION ZONE DERM: LOCATION ZONE: FACE

## 2019-05-24 ASSESSMENT — LOCATION DETAILED DESCRIPTION DERM: LOCATION DETAILED: LEFT INFERIOR CENTRAL MALAR CHEEK

## 2019-05-24 ASSESSMENT — LOCATION SIMPLE DESCRIPTION DERM: LOCATION SIMPLE: LEFT CHEEK

## 2019-05-24 NOTE — PROCEDURE: HYDRAFACIAL
Number Of Passes: 0
Post-Care Instructions: I reviewed with the patient in detail post-care instructions. Patient should stay away from the sun and wear sun protection until treated areas are fully healed.
Detail Level: Zone
Consent: Written consent obtained, risks reviewed including but not limited to crusting, scabbing, blistering, scarring, darker or lighter pigmentary change, bruising, and/or incomplete response.
Indication: skin texture
Treatment Number: 1
Vacuum Pressure: 12

## 2019-06-13 ENCOUNTER — APPOINTMENT (RX ONLY)
Dept: URBAN - METROPOLITAN AREA CLINIC 126 | Facility: CLINIC | Age: 44
Setting detail: DERMATOLOGY
End: 2019-06-13

## 2019-06-13 DIAGNOSIS — Z41.9 ENCOUNTER FOR PROCEDURE FOR PURPOSES OTHER THAN REMEDYING HEALTH STATE, UNSPECIFIED: ICD-10-CM

## 2019-06-13 PROCEDURE — ? HYDRAFACIAL

## 2019-06-13 ASSESSMENT — LOCATION DETAILED DESCRIPTION DERM: LOCATION DETAILED: LEFT CENTRAL MALAR CHEEK

## 2019-06-13 ASSESSMENT — LOCATION ZONE DERM: LOCATION ZONE: FACE

## 2019-06-13 ASSESSMENT — LOCATION SIMPLE DESCRIPTION DERM: LOCATION SIMPLE: LEFT CHEEK

## 2019-06-13 NOTE — PROCEDURE: HYDRAFACIAL
Additional Vacuum Pressure (Won't Render If 0): 0
Treatment Number: 1
Detail Level: Zone
Vacuum Pressure: [address] [facility]
Consent: Written consent obtained, risks reviewed including but not limited to crusting, scabbing, blistering, scarring, darker or lighter pigmentary change, bruising, and/or incomplete response.
Post-Care Instructions: I reviewed with the patient in detail post-care instructions. Patient should stay away from the sun and wear sun protection until treated areas are fully healed.
Indication: skin texture
Glycolic Acid %: 7.5%

## 2019-07-11 ENCOUNTER — APPOINTMENT (RX ONLY)
Dept: URBAN - METROPOLITAN AREA CLINIC 126 | Facility: CLINIC | Age: 44
Setting detail: DERMATOLOGY
End: 2019-07-11

## 2019-07-11 DIAGNOSIS — Z41.9 ENCOUNTER FOR PROCEDURE FOR PURPOSES OTHER THAN REMEDYING HEALTH STATE, UNSPECIFIED: ICD-10-CM

## 2019-07-11 PROCEDURE — ? HYDRAFACIAL

## 2019-07-11 ASSESSMENT — LOCATION ZONE DERM: LOCATION ZONE: FACE

## 2019-07-11 ASSESSMENT — LOCATION DETAILED DESCRIPTION DERM: LOCATION DETAILED: LEFT INFERIOR MEDIAL MALAR CHEEK

## 2019-07-11 ASSESSMENT — LOCATION SIMPLE DESCRIPTION DERM: LOCATION SIMPLE: LEFT CHEEK

## 2019-07-11 NOTE — PROCEDURE: HYDRAFACIAL
Glycolic Acid %: 7.5%
Additional Vacuum Pressure (Won't Render If 0): 0
Treatment Number: 1
Vacuum Pressure: 12
Post-Care Instructions: I reviewed with the patient in detail post-care instructions. Patient should stay away from the sun and wear sun protection until treated areas are fully healed.
Consent: Written consent obtained, risks reviewed including but not limited to crusting, scabbing, blistering, scarring, darker or lighter pigmentary change, bruising, and/or incomplete response.
Indication: skin texture
Detail Level: Zone

## 2019-08-08 ENCOUNTER — APPOINTMENT (RX ONLY)
Dept: URBAN - METROPOLITAN AREA CLINIC 126 | Facility: CLINIC | Age: 44
Setting detail: DERMATOLOGY
End: 2019-08-08

## 2019-08-08 DIAGNOSIS — Z41.9 ENCOUNTER FOR PROCEDURE FOR PURPOSES OTHER THAN REMEDYING HEALTH STATE, UNSPECIFIED: ICD-10-CM

## 2019-08-08 PROCEDURE — ? HYDRAFACIAL

## 2019-08-08 ASSESSMENT — LOCATION DETAILED DESCRIPTION DERM: LOCATION DETAILED: LEFT INFERIOR CENTRAL MALAR CHEEK

## 2019-08-08 ASSESSMENT — LOCATION ZONE DERM: LOCATION ZONE: FACE

## 2019-08-08 ASSESSMENT — LOCATION SIMPLE DESCRIPTION DERM: LOCATION SIMPLE: LEFT CHEEK

## 2019-08-08 NOTE — PROCEDURE: HYDRAFACIAL
Additional Vacuum Pressure (Won't Render If 0): 0
Vacuum Pressure: 12
Consent: Written consent obtained, risks reviewed including but not limited to crusting, scabbing, blistering, scarring, darker or lighter pigmentary change, bruising, and/or incomplete response.
Post-Care Instructions: I reviewed with the patient in detail post-care instructions. Patient should stay away from the sun and wear sun protection until treated areas are fully healed.
Detail Level: Zone
Glycolic Acid %: 7.5%
Indication: skin texture
Treatment Number: 1

## 2019-09-09 ENCOUNTER — APPOINTMENT (RX ONLY)
Dept: URBAN - METROPOLITAN AREA CLINIC 126 | Facility: CLINIC | Age: 44
Setting detail: DERMATOLOGY
End: 2019-09-09

## 2019-09-09 DIAGNOSIS — Z41.9 ENCOUNTER FOR PROCEDURE FOR PURPOSES OTHER THAN REMEDYING HEALTH STATE, UNSPECIFIED: ICD-10-CM

## 2019-09-09 PROCEDURE — ? HYDRAFACIAL

## 2019-09-09 ASSESSMENT — LOCATION DETAILED DESCRIPTION DERM: LOCATION DETAILED: LEFT INFERIOR MEDIAL MALAR CHEEK

## 2019-09-09 ASSESSMENT — LOCATION SIMPLE DESCRIPTION DERM: LOCATION SIMPLE: LEFT CHEEK

## 2019-09-09 ASSESSMENT — LOCATION ZONE DERM: LOCATION ZONE: FACE

## 2019-09-09 NOTE — PROCEDURE: HYDRAFACIAL
Consent: Written consent obtained, risks reviewed including but not limited to crusting, scabbing, blistering, scarring, darker or lighter pigmentary change, bruising, and/or incomplete response.
Post-Care Instructions: I reviewed with the patient in detail post-care instructions. Patient should stay away from the sun and wear sun protection until treated areas are fully healed.
Glycolic Acid %: 7.5%
Additional Vacuum Pressure (Won't Render If 0): 0
Treatment Number: 1
Detail Level: Zone
Vacuum Pressure: 12
Indication: skin texture

## 2019-10-01 ENCOUNTER — APPOINTMENT (RX ONLY)
Dept: URBAN - METROPOLITAN AREA CLINIC 126 | Facility: CLINIC | Age: 44
Setting detail: DERMATOLOGY
End: 2019-10-01

## 2019-10-01 DIAGNOSIS — Z41.9 ENCOUNTER FOR PROCEDURE FOR PURPOSES OTHER THAN REMEDYING HEALTH STATE, UNSPECIFIED: ICD-10-CM

## 2019-10-01 PROCEDURE — ? HYDRAFACIAL

## 2019-10-01 ASSESSMENT — LOCATION SIMPLE DESCRIPTION DERM: LOCATION SIMPLE: LEFT CHEEK

## 2019-10-01 ASSESSMENT — LOCATION ZONE DERM: LOCATION ZONE: FACE

## 2019-10-01 ASSESSMENT — LOCATION DETAILED DESCRIPTION DERM: LOCATION DETAILED: LEFT INFERIOR MEDIAL MALAR CHEEK

## 2019-10-01 NOTE — PROCEDURE: HYDRAFACIAL
Consent: Written consent obtained, risks reviewed including but not limited to crusting, scabbing, blistering, scarring, darker or lighter pigmentary change, bruising, and/or incomplete response.
Vacuum Pressure: 12
Indication: skin texture
Number Of Passes: 0
Glycolic Acid %: 7.5%
Treatment Number: 1
Detail Level: Zone
Post-Care Instructions: I reviewed with the patient in detail post-care instructions. Patient should stay away from the sun and wear sun protection until treated areas are fully healed.

## 2019-10-29 ENCOUNTER — APPOINTMENT (RX ONLY)
Dept: URBAN - METROPOLITAN AREA CLINIC 126 | Facility: CLINIC | Age: 44
Setting detail: DERMATOLOGY
End: 2019-10-29

## 2019-10-29 DIAGNOSIS — Z41.9 ENCOUNTER FOR PROCEDURE FOR PURPOSES OTHER THAN REMEDYING HEALTH STATE, UNSPECIFIED: ICD-10-CM

## 2019-10-29 PROCEDURE — ? ULTHERAPY

## 2019-10-29 ASSESSMENT — LOCATION DETAILED DESCRIPTION DERM
LOCATION DETAILED: LEFT SUPERIOR LATERAL NECK
LOCATION DETAILED: LEFT CENTRAL BUCCAL CHEEK

## 2019-10-29 ASSESSMENT — LOCATION ZONE DERM
LOCATION ZONE: NECK
LOCATION ZONE: FACE

## 2019-10-29 ASSESSMENT — LOCATION SIMPLE DESCRIPTION DERM
LOCATION SIMPLE: LEFT ANTERIOR NECK
LOCATION SIMPLE: LEFT CHEEK

## 2019-10-29 NOTE — PROCEDURE: ULTHERAPY
Patient Reported Discomfort: 0
Treatment Number (Optional): 1
Treatment Number (Optional): 2
Anesthesia Volume In Cc: 6
Pre-Procedures Photographs: Yes
Total Lines (Use Numbers Only, No Special Characters Or $): [place]
Consent: Written consent obtained, risks reviewed including but not limited to crusting, scabbing, blistering, scarring, darker or lighter pigmentary change, and/or incomplete improvement.
Use Distraction Techniques In Note: No
Detail Level: Zone
Post-Care Instructions: I reviewed with the patient in detail post-care instructions. Patient should stay away from the sun and wear sun protection until treated areas are fully healed.
Anesthesia Type: 1% lidocaine with epinephrine

## 2019-11-11 ENCOUNTER — APPOINTMENT (RX ONLY)
Dept: URBAN - METROPOLITAN AREA CLINIC 126 | Facility: CLINIC | Age: 44
Setting detail: DERMATOLOGY
End: 2019-11-11

## 2019-11-11 DIAGNOSIS — Z41.9 ENCOUNTER FOR PROCEDURE FOR PURPOSES OTHER THAN REMEDYING HEALTH STATE, UNSPECIFIED: ICD-10-CM

## 2019-11-11 PROCEDURE — ? HYDRAFACIAL

## 2019-11-11 ASSESSMENT — LOCATION DETAILED DESCRIPTION DERM: LOCATION DETAILED: LEFT CENTRAL MALAR CHEEK

## 2019-11-11 ASSESSMENT — LOCATION SIMPLE DESCRIPTION DERM: LOCATION SIMPLE: LEFT CHEEK

## 2019-11-11 ASSESSMENT — LOCATION ZONE DERM: LOCATION ZONE: FACE

## 2019-11-11 NOTE — PROCEDURE: HYDRAFACIAL
Additional Vacuum Pressure (Won't Render If 0): 0
Consent: Written consent obtained, risks reviewed including but not limited to crusting, scabbing, blistering, scarring, darker or lighter pigmentary change, bruising, and/or incomplete response.
Vacuum Pressure: 12
Post-Care Instructions: I reviewed with the patient in detail post-care instructions. Patient should stay away from the sun and wear sun protection until treated areas are fully healed.
Treatment Number: 1
Glycolic Acid %: 7.5%
Detail Level: Zone
Indication: skin texture

## 2019-12-02 ENCOUNTER — APPOINTMENT (RX ONLY)
Dept: URBAN - METROPOLITAN AREA CLINIC 126 | Facility: CLINIC | Age: 44
Setting detail: DERMATOLOGY
End: 2019-12-02

## 2019-12-02 DIAGNOSIS — Z41.9 ENCOUNTER FOR PROCEDURE FOR PURPOSES OTHER THAN REMEDYING HEALTH STATE, UNSPECIFIED: ICD-10-CM

## 2019-12-02 PROCEDURE — ? HYDRAFACIAL

## 2019-12-02 ASSESSMENT — LOCATION DETAILED DESCRIPTION DERM: LOCATION DETAILED: LEFT INFERIOR CENTRAL MALAR CHEEK

## 2019-12-02 ASSESSMENT — LOCATION ZONE DERM: LOCATION ZONE: FACE

## 2019-12-02 ASSESSMENT — LOCATION SIMPLE DESCRIPTION DERM: LOCATION SIMPLE: LEFT CHEEK

## 2019-12-02 NOTE — PROCEDURE: HYDRAFACIAL
Vacuum Pressure: 12
Indication: skin texture
Post-Care Instructions: I reviewed with the patient in detail post-care instructions. Patient should stay away from the sun and wear sun protection until treated areas are fully healed.
Detail Level: Zone
Treatment Number: 1
Number Of Passes: 0
Consent: Written consent obtained, risks reviewed including but not limited to crusting, scabbing, blistering, scarring, darker or lighter pigmentary change, bruising, and/or incomplete response.
Glycolic Acid %: 7.5%

## 2020-01-06 ENCOUNTER — APPOINTMENT (RX ONLY)
Dept: URBAN - METROPOLITAN AREA CLINIC 126 | Facility: CLINIC | Age: 45
Setting detail: DERMATOLOGY
End: 2020-01-06

## 2020-01-06 DIAGNOSIS — Z41.9 ENCOUNTER FOR PROCEDURE FOR PURPOSES OTHER THAN REMEDYING HEALTH STATE, UNSPECIFIED: ICD-10-CM

## 2020-01-06 PROCEDURE — ? HYDRAFACIAL

## 2020-01-06 ASSESSMENT — LOCATION DETAILED DESCRIPTION DERM: LOCATION DETAILED: LEFT CENTRAL MALAR CHEEK

## 2020-01-06 ASSESSMENT — LOCATION SIMPLE DESCRIPTION DERM: LOCATION SIMPLE: LEFT CHEEK

## 2020-01-06 ASSESSMENT — LOCATION ZONE DERM: LOCATION ZONE: FACE

## 2020-01-06 NOTE — PROCEDURE: HYDRAFACIAL
Post-Care Instructions: I reviewed with the patient in detail post-care instructions. Patient should stay away from the sun and wear sun protection until treated areas are fully healed.
Additional Vacuum Pressure (Won't Render If 0): 0
Glycolic Acid %: 7.5%
Indication: skin texture
Vacuum Pressure: 12
Treatment Number: 1
Consent: Written consent obtained, risks reviewed including but not limited to crusting, scabbing, blistering, scarring, darker or lighter pigmentary change, bruising, and/or incomplete response.
Detail Level: Zone

## 2020-01-29 ENCOUNTER — APPOINTMENT (RX ONLY)
Dept: URBAN - METROPOLITAN AREA CLINIC 126 | Facility: CLINIC | Age: 45
Setting detail: DERMATOLOGY
End: 2020-01-29

## 2020-01-29 DIAGNOSIS — Z41.9 ENCOUNTER FOR PROCEDURE FOR PURPOSES OTHER THAN REMEDYING HEALTH STATE, UNSPECIFIED: ICD-10-CM

## 2020-01-29 PROCEDURE — ? HYDRAFACIAL

## 2020-01-29 ASSESSMENT — LOCATION ZONE DERM: LOCATION ZONE: FACE

## 2020-01-29 ASSESSMENT — LOCATION DETAILED DESCRIPTION DERM: LOCATION DETAILED: LEFT SUPERIOR CENTRAL BUCCAL CHEEK

## 2020-01-29 ASSESSMENT — LOCATION SIMPLE DESCRIPTION DERM: LOCATION SIMPLE: LEFT CHEEK

## 2020-01-29 NOTE — PROCEDURE: HYDRAFACIAL
Indication: skin texture
Consent: Written consent obtained, risks reviewed including but not limited to crusting, scabbing, blistering, scarring, darker or lighter pigmentary change, bruising, and/or incomplete response.
Additional Vacuum Pressure (Won't Render If 0): 0
Treatment Number: 1
Post-Care Instructions: I reviewed with the patient in detail post-care instructions. Patient should stay away from the sun and wear sun protection until treated areas are fully healed.
Vacuum Pressure: 12
Glycolic Acid %: 7.5%
Detail Level: Zone

## 2020-02-25 ENCOUNTER — APPOINTMENT (RX ONLY)
Dept: URBAN - METROPOLITAN AREA CLINIC 126 | Facility: CLINIC | Age: 45
Setting detail: DERMATOLOGY
End: 2020-02-25

## 2020-02-25 DIAGNOSIS — Z41.9 ENCOUNTER FOR PROCEDURE FOR PURPOSES OTHER THAN REMEDYING HEALTH STATE, UNSPECIFIED: ICD-10-CM

## 2020-02-25 PROCEDURE — ? HYDRAFACIAL

## 2020-02-25 ASSESSMENT — LOCATION ZONE DERM: LOCATION ZONE: FACE

## 2020-02-25 ASSESSMENT — LOCATION SIMPLE DESCRIPTION DERM: LOCATION SIMPLE: LEFT CHEEK

## 2020-02-25 ASSESSMENT — LOCATION DETAILED DESCRIPTION DERM: LOCATION DETAILED: LEFT INFERIOR CENTRAL MALAR CHEEK

## 2020-02-25 NOTE — PROCEDURE: HYDRAFACIAL
Vacuum Pressure: 12
Additional Vacuum Pressure (Won't Render If 0): 0
Treatment Number: 1
Detail Level: Zone
Post-Care Instructions: I reviewed with the patient in detail post-care instructions. Patient should stay away from the sun and wear sun protection until treated areas are fully healed.
Glycolic Acid %: 7.5%
Consent: Written consent obtained, risks reviewed including but not limited to crusting, scabbing, blistering, scarring, darker or lighter pigmentary change, bruising, and/or incomplete response.
Indication: skin texture

## 2022-04-02 ASSESSMENT — VISUAL ACUITY
OS_CC: 20/30-1
OD_CC: 20/40+3

## 2022-04-02 ASSESSMENT — TONOMETRY
OD_IOP_MMHG: 17
OS_IOP_MMHG: 17

## 2024-04-14 NOTE — HPI: FOLLOW UP OTHER
What Is Your Reason For Requesting A Follow-Up Appointment?: 3 month follow up status post cool sculpting to upper and lower abdomen \\nUpper abdomen treated on 8/1/18 with 2 cool fit applicators\\nLower abdomen treated on 9/12/18 with 2 cool fit applicators
vomiting/headache